# Patient Record
Sex: MALE | Race: WHITE | NOT HISPANIC OR LATINO | Employment: FULL TIME | URBAN - METROPOLITAN AREA
[De-identification: names, ages, dates, MRNs, and addresses within clinical notes are randomized per-mention and may not be internally consistent; named-entity substitution may affect disease eponyms.]

---

## 2017-07-20 PROBLEM — M25.561 CHRONIC PAIN OF RIGHT KNEE: Status: ACTIVE | Noted: 2017-07-20

## 2017-07-20 PROBLEM — G89.29 CHRONIC PAIN OF RIGHT KNEE: Status: ACTIVE | Noted: 2017-07-20

## 2020-11-17 ENCOUNTER — HOSPITAL ENCOUNTER (EMERGENCY)
Facility: HOSPITAL | Age: 60
Discharge: HOME | End: 2020-11-17
Attending: SURGERY
Payer: COMMERCIAL

## 2020-11-17 VITALS
BODY MASS INDEX: 35.44 KG/M2 | HEART RATE: 70 BPM | SYSTOLIC BLOOD PRESSURE: 153 MMHG | WEIGHT: 240 LBS | TEMPERATURE: 98 F | OXYGEN SATURATION: 100 % | RESPIRATION RATE: 16 BRPM | DIASTOLIC BLOOD PRESSURE: 87 MMHG

## 2020-11-17 DIAGNOSIS — I10 HYPERTENSION, UNSPECIFIED TYPE: Primary | ICD-10-CM

## 2020-11-17 PROCEDURE — 25000003 PHARM REV CODE 250: Performed by: SURGERY

## 2020-11-17 PROCEDURE — 99283 EMERGENCY DEPT VISIT LOW MDM: CPT

## 2020-11-17 RX ORDER — CLONIDINE HYDROCHLORIDE 0.1 MG/1
0.2 TABLET ORAL
Status: COMPLETED | OUTPATIENT
Start: 2020-11-17 | End: 2020-11-17

## 2020-11-17 RX ORDER — ACETAMINOPHEN 325 MG/1
650 TABLET ORAL
Status: COMPLETED | OUTPATIENT
Start: 2020-11-17 | End: 2020-11-17

## 2020-11-17 RX ADMIN — CLONIDINE HYDROCHLORIDE 0.2 MG: 0.1 TABLET ORAL at 09:11

## 2020-11-17 RX ADMIN — ACETAMINOPHEN 650 MG: 325 TABLET ORAL at 09:11

## 2020-11-17 NOTE — ED TRIAGE NOTES
60 y.o. male presents to ER   Chief Complaint   Patient presents with    Hypertension   pt c/o hypertension last few days, reports DR. Zeng recently increased his blood pressure medication. No acute distress noted.

## 2020-11-17 NOTE — ED NOTES
Patient is awake, alert, aware of environment, airway is open and patent, respirations are spontaneous, normal respiratory effort and rate noted, full ROM in all extremities, no distress noted. Bed in low, locked position, pt able to change position independently. Will continue to monitor.

## 2020-11-17 NOTE — Clinical Note
"Roberto Phillipsnavneet Harris was seen and treated in our emergency department on 11/17/2020.  He may return to work on 11/17/2020.       If you have any questions or concerns, please don't hesitate to call.      Jarrett Goodrich Jr., MD"

## 2020-11-17 NOTE — ED PROVIDER NOTES
Encounter Date: 11/17/2020       History     Chief Complaint   Patient presents with    Hypertension     Patient is 60-year-old white male who has routinely taken lisinopril 5 mg daily.  His dosage was increased to 20 mg daily last week, he has noted systolic blood pressure over 200 on 2 separate occasions.  Was sent in to be monitored by his company medic.  Complains of headache at this time which seems to be correlated with his increased systolic blood pressure.  No nausea or vomiting is reported.        Review of patient's allergies indicates:   Allergen Reactions    Demerol [meperidine]     Penicillins      REACTION UNKNOWN, STATES HAD A CHILDHOOD REACTION     Past Medical History:   Diagnosis Date    Allergic sinusitis     Cataract     Hypertension     Osteoarthritis     Strabismus     Wears glasses      Past Surgical History:   Procedure Laterality Date    EYE MUSCLE SURGERY Bilateral     X 2, REPAIR CROSSED EYES    KNEE ARTHROSCOPY W/ SYNOVECTOMY Right      Family History   Problem Relation Age of Onset    Cancer Mother     Cancer Father     Cancer Brother      Social History     Tobacco Use    Smoking status: Never Smoker    Smokeless tobacco: Never Used   Substance Use Topics    Alcohol use: No    Drug use: No     Review of Systems   Constitutional: Negative for fever.   HENT: Negative for sore throat.    Respiratory: Negative for shortness of breath.    Cardiovascular: Negative for chest pain.   Gastrointestinal: Negative for nausea.   Genitourinary: Negative for dysuria.   Musculoskeletal: Negative for back pain.   Skin: Negative for rash.   Neurological: Positive for headaches. Negative for weakness.   Hematological: Does not bruise/bleed easily.       Physical Exam     Initial Vitals [11/17/20 0932]   BP Pulse Resp Temp SpO2   (!) 202/93 80 17 98.1 °F (36.7 °C) 100 %      MAP       --         Physical Exam    Nursing note and vitals reviewed.  Constitutional: He appears well-developed  and well-nourished.   HENT:   Head: Normocephalic and atraumatic.   Eyes: EOM are normal. Pupils are equal, round, and reactive to light.   Neck: Normal range of motion. Neck supple.   Cardiovascular: Normal rate.   Pulmonary/Chest: Breath sounds normal. No respiratory distress. He has no wheezes.   Abdominal: Soft. He exhibits no distension.   Musculoskeletal: Normal range of motion.   Neurological: He is alert and oriented to person, place, and time.   Skin: Skin is warm and dry.   Psychiatric: He has a normal mood and affect. Thought content normal.         ED Course   Procedures  Labs Reviewed - No data to display       Imaging Results    None            a dose of clonidine has reduced systolic blood pressure to 158, patient now asymptomatic.                           Clinical Impression:     ICD-10-CM ICD-9-CM   1. Hypertension, unspecified type  I10 401.9                      Disposition:   Disposition: Discharged  Condition: Stable                          Jarrett Goodrich Jr., MD  11/17/20 1110